# Patient Record
Sex: FEMALE | Race: BLACK OR AFRICAN AMERICAN | NOT HISPANIC OR LATINO | URBAN - METROPOLITAN AREA
[De-identification: names, ages, dates, MRNs, and addresses within clinical notes are randomized per-mention and may not be internally consistent; named-entity substitution may affect disease eponyms.]

---

## 2018-11-04 ENCOUNTER — EMERGENCY (EMERGENCY)
Facility: HOSPITAL | Age: 58
LOS: 1 days | End: 2018-11-04
Payer: COMMERCIAL

## 2018-11-04 PROCEDURE — 73610 X-RAY EXAM OF ANKLE: CPT | Mod: 26,LT

## 2018-11-04 PROCEDURE — 73630 X-RAY EXAM OF FOOT: CPT | Mod: 26,LT

## 2018-11-04 PROCEDURE — 29515 APPLICATION SHORT LEG SPLINT: CPT

## 2018-11-04 PROCEDURE — 99284 EMERGENCY DEPT VISIT MOD MDM: CPT | Mod: 25

## 2023-04-18 ENCOUNTER — OFFICE (OUTPATIENT)
Dept: URBAN - METROPOLITAN AREA CLINIC 38 | Facility: CLINIC | Age: 63
Setting detail: OPHTHALMOLOGY
End: 2023-04-18
Payer: COMMERCIAL

## 2023-04-18 DIAGNOSIS — E11.9: ICD-10-CM

## 2023-04-18 DIAGNOSIS — H52.4: ICD-10-CM

## 2023-04-18 DIAGNOSIS — H52.223: ICD-10-CM

## 2023-04-18 DIAGNOSIS — D31.02: ICD-10-CM

## 2023-04-18 DIAGNOSIS — H35.033: ICD-10-CM

## 2023-04-18 DIAGNOSIS — D31.01: ICD-10-CM

## 2023-04-18 DIAGNOSIS — H43.393: ICD-10-CM

## 2023-04-18 DIAGNOSIS — H35.363: ICD-10-CM

## 2023-04-18 DIAGNOSIS — H43.811: ICD-10-CM

## 2023-04-18 DIAGNOSIS — H25.13: ICD-10-CM

## 2023-04-18 PROCEDURE — 92014 COMPRE OPH EXAM EST PT 1/>: CPT | Performed by: OPTOMETRIST

## 2023-04-18 PROCEDURE — 92250 FUNDUS PHOTOGRAPHY W/I&R: CPT | Performed by: OPTOMETRIST

## 2023-04-18 PROCEDURE — 92015 DETERMINE REFRACTIVE STATE: CPT | Performed by: OPTOMETRIST

## 2023-04-18 ASSESSMENT — REFRACTION_MANIFEST
OS_ADD: +2.25
OU_VA: 20/20
OS_VA2: 20/20(J1+)
OS_AXIS: 043
OD_AXIS: 065
OD_AXIS: 069
OD_SPHERE: -0.75
OS_CYLINDER: -1.25
OD_CYLINDER: -1.50
OS_ADD: +2.50
OS_SPHERE: -0.50
OU_VA: 20/20
OD_CYLINDER: -1.50
OS_SPHERE: -0.75
OS_CYLINDER: -1.25
OD_VA1: 20/20
OS_AXIS: 050
OS_VA1: 20/25
OD_VA1: 20/20
OD_ADD: +2.25
OD_SPHERE: -1.00
OS_VA1: 20/20
OD_VA2: 20/20(J1+)
OD_ADD: +2.50

## 2023-04-18 ASSESSMENT — REFRACTION_AUTOREFRACTION
OS_SPHERE: PLANO
OD_SPHERE: -1.00
OD_CYLINDER: -1.25
OD_AXIS: 77
OS_CYLINDER: -1.50
OS_AXIS: 072

## 2023-04-18 ASSESSMENT — REFRACTION_CURRENTRX
OD_AXIS: 069
OS_SPHERE: -0.25
OS_OVR_VA: 20/
OD_OVR_VA: 20/
OS_CYLINDER: -1.25
OD_ADD: +2.25
OS_ADD: +2.00
OS_VPRISM_DIRECTION: PROGS
OD_VPRISM_DIRECTION: PROGS
OD_CYLINDER: -1.50
OD_SPHERE: -0.75
OS_AXIS: 043

## 2023-04-18 ASSESSMENT — SPHEQUIV_DERIVED
OD_SPHEQUIV: -1.5
OD_SPHEQUIV: -1.75
OS_SPHEQUIV: -1.125
OS_SPHEQUIV: -1.375
OD_SPHEQUIV: -1.625

## 2023-04-18 ASSESSMENT — TONOMETRY
OD_IOP_MMHG: 15
OS_IOP_MMHG: 17

## 2023-04-18 ASSESSMENT — AXIALLENGTH_DERIVED
OD_AL: 24.6837
OS_AL: 24.3769
OS_AL: 24.4813
OD_AL: 24.7908
OD_AL: 24.7372

## 2023-04-18 ASSESSMENT — KERATOMETRY
OS_AXISANGLE_DEGREES: 133
OD_AXISANGLE_DEGREES: 090
OD_K1POWER_DIOPTERS: 42.25
OD_K2POWER_DIOPTERS: 42.25
OS_K1POWER_DIOPTERS: 42.25
METHOD_AUTO_MANUAL: AUTO
OS_K2POWER_DIOPTERS: 43.00

## 2023-04-18 ASSESSMENT — CONFRONTATIONAL VISUAL FIELD TEST (CVF)
OS_FINDINGS: FULL
OD_FINDINGS: FULL

## 2023-04-18 ASSESSMENT — VISUAL ACUITY
OS_BCVA: 20/20-1
OD_BCVA: 20/20-1

## 2023-08-29 PROBLEM — Z00.00 ENCOUNTER FOR PREVENTIVE HEALTH EXAMINATION: Status: ACTIVE | Noted: 2023-08-29

## 2023-09-06 ENCOUNTER — APPOINTMENT (OUTPATIENT)
Dept: CARDIOLOGY | Facility: CLINIC | Age: 63
End: 2023-09-06
Payer: COMMERCIAL

## 2023-09-06 ENCOUNTER — NON-APPOINTMENT (OUTPATIENT)
Age: 63
End: 2023-09-06

## 2023-09-06 VITALS
WEIGHT: 133 LBS | DIASTOLIC BLOOD PRESSURE: 80 MMHG | HEIGHT: 64 IN | HEART RATE: 93 BPM | SYSTOLIC BLOOD PRESSURE: 140 MMHG | OXYGEN SATURATION: 99 % | BODY MASS INDEX: 22.71 KG/M2

## 2023-09-06 VITALS — DIASTOLIC BLOOD PRESSURE: 82 MMHG | SYSTOLIC BLOOD PRESSURE: 148 MMHG

## 2023-09-06 DIAGNOSIS — Z13.6 ENCOUNTER FOR SCREENING FOR CARDIOVASCULAR DISORDERS: ICD-10-CM

## 2023-09-06 DIAGNOSIS — Z78.9 OTHER SPECIFIED HEALTH STATUS: ICD-10-CM

## 2023-09-06 DIAGNOSIS — E11.9 TYPE 2 DIABETES MELLITUS W/OUT COMPLICATIONS: ICD-10-CM

## 2023-09-06 PROCEDURE — 99204 OFFICE O/P NEW MOD 45 MIN: CPT | Mod: 25

## 2023-09-06 PROCEDURE — 93000 ELECTROCARDIOGRAM COMPLETE: CPT

## 2023-09-06 RX ORDER — METFORMIN HYDROCHLORIDE 500 MG/1
500 TABLET, COATED ORAL TWICE DAILY
Refills: 0 | Status: ACTIVE | COMMUNITY
Start: 2023-09-06

## 2023-09-06 NOTE — DISCUSSION/SUMMARY
[EKG obtained to assist in diagnosis and management of assessed problem(s)] : EKG obtained to assist in diagnosis and management of assessed problem(s) [FreeTextEntry1] : 1. Extrasystolic Beats: Recommend 3 day Zio to evaluate burden. Recommend echocardiogram and ETT. Labs reviewed from 7/21/2023 and within normal limits.  2. HTN: Goal BP less than 130/80. Recommend low salt diet. Continue losartan 50mg daily.  Follow up after testing.

## 2023-09-06 NOTE — PHYSICAL EXAM
[No Murmur] : no murmur [Normal] : moves all extremities, no focal deficits, normal speech [de-identified] :  No carotid bruits auscultated bilaterally.. [de-identified] : extrasystolic beats auscultated

## 2023-09-06 NOTE — HISTORY OF PRESENT ILLNESS
[FreeTextEntry1] : 62 year old female with PMHx of HTN, DM presents for a cardiac evaluation for PVCs. Patient is asymptomatic. She has no exertional chest pain, dyspnea or palpitations.   There is no known family history in first degree relatives of cardiovascular disease.  There is no history of MI, CVA, CHF, or previous coronary intervention.

## 2023-10-17 ENCOUNTER — OFFICE (OUTPATIENT)
Dept: URBAN - METROPOLITAN AREA CLINIC 38 | Facility: CLINIC | Age: 63
Setting detail: OPHTHALMOLOGY
End: 2023-10-17
Payer: COMMERCIAL

## 2023-10-17 DIAGNOSIS — H35.363: ICD-10-CM

## 2023-10-17 DIAGNOSIS — H43.811: ICD-10-CM

## 2023-10-17 DIAGNOSIS — E11.9: ICD-10-CM

## 2023-10-17 DIAGNOSIS — H43.393: ICD-10-CM

## 2023-10-17 PROCEDURE — 99213 OFFICE O/P EST LOW 20 MIN: CPT | Performed by: OPTOMETRIST

## 2023-10-17 ASSESSMENT — REFRACTION_CURRENTRX
OD_CYLINDER: -1.50
OS_SPHERE: -0.75
OS_OVR_VA: 20/
OS_ADD: +2.25
OS_VPRISM_DIRECTION: PROGS
OD_SPHERE: -1.00
OD_AXIS: 069
OD_ADD: +2.25
OS_CYLINDER: -1.25
OD_VPRISM_DIRECTION: PROGS
OS_AXIS: 043
OD_OVR_VA: 20/

## 2023-10-17 ASSESSMENT — AXIALLENGTH_DERIVED
OS_AL: 24.426
OD_AL: 24.7847
OD_AL: 24.6776
OS_AL: 24.5309
OD_AL: 24.8926

## 2023-10-17 ASSESSMENT — REFRACTION_MANIFEST
OS_AXIS: 050
OS_CYLINDER: -1.25
OD_AXIS: 065
OS_CYLINDER: -1.25
OD_ADD: +2.25
OS_ADD: +2.50
OS_VA1: 20/25
OD_VA1: 20/20
OS_VA1: 20/20
OD_SPHERE: -0.75
OD_ADD: +2.50
OS_SPHERE: -0.50
OD_CYLINDER: -1.50
OS_ADD: +2.25
OD_VA1: 20/20
OS_VA2: 20/20(J1+)
OS_SPHERE: -0.75
OD_SPHERE: -1.00
OD_AXIS: 069
OU_VA: 20/20
OD_CYLINDER: -1.50
OU_VA: 20/20
OD_VA2: 20/20(J1+)
OS_AXIS: 043

## 2023-10-17 ASSESSMENT — TONOMETRY
OS_IOP_MMHG: 17
OD_IOP_MMHG: 14

## 2023-10-17 ASSESSMENT — REFRACTION_AUTOREFRACTION
OD_SPHERE: -0.50
OS_CYLINDER: -1.50
OD_AXIS: 077
OS_SPHERE: PLANO
OD_CYLINDER: -1.50
OS_AXIS: 074

## 2023-10-17 ASSESSMENT — KERATOMETRY
OD_K1POWER_DIOPTERS: 42.00
OS_K2POWER_DIOPTERS: 42.75
OD_AXISANGLE_DEGREES: 090
METHOD_AUTO_MANUAL: AUTO
OS_AXISANGLE_DEGREES: 146
OS_K1POWER_DIOPTERS: 42.25
OD_K2POWER_DIOPTERS: 42.00

## 2023-10-17 ASSESSMENT — CONFRONTATIONAL VISUAL FIELD TEST (CVF)
OD_FINDINGS: FULL
OS_FINDINGS: FULL

## 2023-10-17 ASSESSMENT — SPHEQUIV_DERIVED
OS_SPHEQUIV: -1.375
OD_SPHEQUIV: -1.25
OD_SPHEQUIV: -1.75
OS_SPHEQUIV: -1.125
OD_SPHEQUIV: -1.5

## 2023-10-17 ASSESSMENT — VISUAL ACUITY
OD_BCVA: 20/25+2
OS_BCVA: 20/25+2

## 2023-10-18 ENCOUNTER — APPOINTMENT (OUTPATIENT)
Dept: CARDIOLOGY | Facility: CLINIC | Age: 63
End: 2023-10-18
Payer: COMMERCIAL

## 2023-10-18 PROCEDURE — 93306 TTE W/DOPPLER COMPLETE: CPT

## 2023-10-24 ENCOUNTER — APPOINTMENT (OUTPATIENT)
Dept: CARDIOLOGY | Facility: CLINIC | Age: 63
End: 2023-10-24
Payer: COMMERCIAL

## 2023-10-24 VITALS
SYSTOLIC BLOOD PRESSURE: 142 MMHG | BODY MASS INDEX: 22.71 KG/M2 | HEIGHT: 64 IN | HEART RATE: 92 BPM | DIASTOLIC BLOOD PRESSURE: 86 MMHG | WEIGHT: 133 LBS | OXYGEN SATURATION: 98 %

## 2023-10-24 PROCEDURE — 93015 CV STRESS TEST SUPVJ I&R: CPT

## 2023-10-24 PROCEDURE — 99214 OFFICE O/P EST MOD 30 MIN: CPT | Mod: 25

## 2023-10-24 RX ORDER — ATORVASTATIN CALCIUM 10 MG/1
10 TABLET, FILM COATED ORAL DAILY
Refills: 0 | Status: ACTIVE | COMMUNITY

## 2023-10-24 RX ORDER — LABETALOL HYDROCHLORIDE 100 MG/1
100 TABLET, FILM COATED ORAL
Qty: 180 | Refills: 3 | Status: ACTIVE | COMMUNITY
Start: 2023-10-24 | End: 1900-01-01

## 2024-04-16 ENCOUNTER — OFFICE (OUTPATIENT)
Dept: URBAN - METROPOLITAN AREA CLINIC 38 | Facility: CLINIC | Age: 64
Setting detail: OPHTHALMOLOGY
End: 2024-04-16
Payer: COMMERCIAL

## 2024-04-16 DIAGNOSIS — H35.363: ICD-10-CM

## 2024-04-16 DIAGNOSIS — H25.13: ICD-10-CM

## 2024-04-16 DIAGNOSIS — H52.223: ICD-10-CM

## 2024-04-16 DIAGNOSIS — D31.02: ICD-10-CM

## 2024-04-16 DIAGNOSIS — H35.033: ICD-10-CM

## 2024-04-16 DIAGNOSIS — H43.811: ICD-10-CM

## 2024-04-16 DIAGNOSIS — H43.393: ICD-10-CM

## 2024-04-16 DIAGNOSIS — E11.9: ICD-10-CM

## 2024-04-16 PROCEDURE — 99213 OFFICE O/P EST LOW 20 MIN: CPT | Performed by: OPTOMETRIST

## 2024-04-23 ENCOUNTER — APPOINTMENT (OUTPATIENT)
Dept: CARDIOLOGY | Facility: CLINIC | Age: 64
End: 2024-04-23
Payer: COMMERCIAL

## 2024-04-23 ENCOUNTER — NON-APPOINTMENT (OUTPATIENT)
Age: 64
End: 2024-04-23

## 2024-04-23 VITALS
OXYGEN SATURATION: 98 % | HEART RATE: 76 BPM | HEIGHT: 64 IN | SYSTOLIC BLOOD PRESSURE: 140 MMHG | WEIGHT: 135 LBS | BODY MASS INDEX: 23.05 KG/M2 | DIASTOLIC BLOOD PRESSURE: 82 MMHG

## 2024-04-23 PROCEDURE — G2211 COMPLEX E/M VISIT ADD ON: CPT

## 2024-04-23 PROCEDURE — 99214 OFFICE O/P EST MOD 30 MIN: CPT

## 2024-04-23 PROCEDURE — 99215 OFFICE O/P EST HI 40 MIN: CPT

## 2024-04-23 PROCEDURE — 93000 ELECTROCARDIOGRAM COMPLETE: CPT

## 2024-04-23 RX ORDER — LOSARTAN POTASSIUM 50 MG/1
50 TABLET, FILM COATED ORAL DAILY
Refills: 0 | Status: DISCONTINUED | COMMUNITY
Start: 2023-09-06 | End: 2024-04-23

## 2024-04-23 RX ORDER — ALENDRONATE SODIUM 70 MG/1
70 TABLET ORAL
Refills: 0 | Status: ACTIVE | COMMUNITY

## 2024-04-23 RX ORDER — AMLODIPINE BESYLATE 10 MG/1
10 TABLET ORAL DAILY
Qty: 90 | Refills: 3 | Status: ACTIVE | COMMUNITY
Start: 2024-04-23 | End: 1900-01-01

## 2024-04-23 NOTE — DISCUSSION/SUMMARY
[FreeTextEntry1] : 1. PACs/short pSVT: recommend continuing Labetalol 100mg BID (high risk medication with no signs of toxicity).  2. HTN: Goal BP less than 130/80. Recommend low salt diet. Reviewed 2/4/2024 labs with patient. Creatinine was 2.27 and it was normal (<1) 7/2023. Patient states she wasn't aware of this finding and she has follow up with PCP and labs in two weeks. She is on losartan 100mg daily. Until the kidney function stabilized, I advise stopping losartan 100mg daily. Replace with amlodipine 10mg daily. Continue Labetalol 100mg BID.   3. Aortic Valve Insufficiency: mild on echocardiogram from 10/18/2023. Recommend BP control to less than 130/80. Periodic echo surveillance.   Follow up in 2 months.  [EKG obtained to assist in diagnosis and management of assessed problem(s)] : EKG obtained to assist in diagnosis and management of assessed problem(s)

## 2024-04-23 NOTE — PHYSICAL EXAM
[No Murmur] : no murmur [Normal] : moves all extremities, no focal deficits, normal speech [de-identified] :  No carotid bruits auscultated bilaterally.. [de-identified] : extrasystolic beats auscultated

## 2024-04-23 NOTE — CARDIOLOGY SUMMARY
[de-identified] : 4/23/20245, NSR, PACs 9/6/2023, NSR, normal ECG [de-identified] : 9/2023, 3 Day Zio: NSR with frequent PACs [de-identified] : 10/24/2023, Plain Treadmill Stress Test: Exercised for 6 minutes and 5 seconds utilizing Standard Alessandro Protocol. No chest pain or abnormal dyspnea. No ECG changes to suggest ischemia. [de-identified] : 10/18/2023, LV EF 60-65%, normal LV diastolic function, mild LVH, mild MR, trace-mild AI, mild TR with estimated PASP of 35mmHg

## 2024-04-23 NOTE — HISTORY OF PRESENT ILLNESS
[FreeTextEntry1] : Historical Perspective: 62 year old female with PMHx of HTN, DM presents for a cardiac evaluation for PVCs. Patient is asymptomatic. She has no exertional chest pain, dyspnea or palpitations.   There is no known family history in first degree relatives of cardiovascular disease.  There is no history of MI, CVA, CHF, or previous coronary intervention.  Current Health Status: Patient with no chest pain, SOB, or palpitations. No hospitalizations since seeing me last. Remains compliant with medications and reports no adverse effects. Reviewed 2/4/2024 labs with patient. Creatinine was 2.27 and it was normal (<1) 7/2023. Patient states she wasn't aware of this finding and she has follow up with PCP and labs in two weeks. She is on losartan 100mg daily.

## 2024-06-26 ENCOUNTER — APPOINTMENT (OUTPATIENT)
Dept: CARDIOLOGY | Facility: CLINIC | Age: 64
End: 2024-06-26
Payer: COMMERCIAL

## 2024-06-26 VITALS
HEART RATE: 88 BPM | WEIGHT: 135 LBS | OXYGEN SATURATION: 97 % | BODY MASS INDEX: 23.05 KG/M2 | DIASTOLIC BLOOD PRESSURE: 60 MMHG | HEIGHT: 64 IN | SYSTOLIC BLOOD PRESSURE: 102 MMHG

## 2024-06-26 DIAGNOSIS — I47.10 SUPRAVENTRICULAR TACHYCARDIA, UNSPECIFIED: ICD-10-CM

## 2024-06-26 DIAGNOSIS — I49.1 ATRIAL PREMATURE DEPOLARIZATION: ICD-10-CM

## 2024-06-26 DIAGNOSIS — I35.1 NONRHEUMATIC AORTIC (VALVE) INSUFFICIENCY: ICD-10-CM

## 2024-06-26 DIAGNOSIS — Z79.899 OTHER LONG TERM (CURRENT) DRUG THERAPY: ICD-10-CM

## 2024-06-26 DIAGNOSIS — I10 ESSENTIAL (PRIMARY) HYPERTENSION: ICD-10-CM

## 2024-06-26 PROCEDURE — 99215 OFFICE O/P EST HI 40 MIN: CPT

## 2024-06-26 PROCEDURE — G2211 COMPLEX E/M VISIT ADD ON: CPT

## 2024-09-02 LAB — HBA1C MFR BLD HPLC: 7.4

## 2024-09-20 ENCOUNTER — RX RENEWAL (OUTPATIENT)
Age: 64
End: 2024-09-20

## 2024-10-19 ENCOUNTER — OFFICE (OUTPATIENT)
Dept: URBAN - METROPOLITAN AREA CLINIC 38 | Facility: CLINIC | Age: 64
Setting detail: OPHTHALMOLOGY
End: 2024-10-19
Payer: COMMERCIAL

## 2024-10-19 DIAGNOSIS — H35.033: ICD-10-CM

## 2024-10-19 DIAGNOSIS — H43.393: ICD-10-CM

## 2024-10-19 DIAGNOSIS — H52.4: ICD-10-CM

## 2024-10-19 DIAGNOSIS — H35.363: ICD-10-CM

## 2024-10-19 DIAGNOSIS — H25.13: ICD-10-CM

## 2024-10-19 PROBLEM — D31.01 NEVUS,CONJUNCTIVAL; RIGHT EYE, LEFT EYE: Status: ACTIVE | Noted: 2024-10-19

## 2024-10-19 PROBLEM — D31.02 NEVUS,CONJUNCTIVAL; RIGHT EYE, LEFT EYE: Status: ACTIVE | Noted: 2024-10-19

## 2024-10-19 PROCEDURE — 92014 COMPRE OPH EXAM EST PT 1/>: CPT | Performed by: OPTOMETRIST

## 2024-10-19 PROCEDURE — 92015 DETERMINE REFRACTIVE STATE: CPT | Performed by: OPTOMETRIST

## 2024-10-19 PROCEDURE — 92250 FUNDUS PHOTOGRAPHY W/I&R: CPT | Performed by: OPTOMETRIST

## 2024-10-19 ASSESSMENT — KERATOMETRY
METHOD_AUTO_MANUAL: AUTO
OD_AXISANGLE_DEGREES: 090
OD_K2POWER_DIOPTERS: 42.00
OD_K1POWER_DIOPTERS: 42.00
OS_AXISANGLE_DEGREES: 145
OS_K2POWER_DIOPTERS: 42.75
OS_K1POWER_DIOPTERS: 42.25

## 2024-10-19 ASSESSMENT — REFRACTION_MANIFEST
OD_ADD: +2.50
OS_AXIS: 043
OD_AXIS: 069
OD_VA2: 20/20(J1+)
OD_ADD: +2.25
OD_SPHERE: -0.75
OS_VA2: 20/20(J1+)
OS_VA1: 20/40
OS_SPHERE: -0.75
OD_AXIS: 069
OU_VA: 20/30
OD_SPHERE: -1.00
OS_VA1: 20/20
OD_VA1: 20/30
OS_AXIS: 050
OS_ADD: +2.25
OS_CYLINDER: -1.25
OD_SPHERE: -0.50
OD_ADD: +2.25
OD_CYLINDER: -1.50
OS_VA1: 20/25
OS_SPHERE: -0.50
OS_SPHERE: -0.50
OS_ADD: +2.50
OS_ADD: +2.25
OD_AXIS: 065
OD_CYLINDER: -1.50
OS_AXIS: 046
OD_CYLINDER: -1.50
OD_VA1: 20/20
OU_VA: 20/20
OU_VA: 20/20
OD_VA1: 20/20
OS_CYLINDER: -1.25
OS_CYLINDER: -1.00

## 2024-10-19 ASSESSMENT — TONOMETRY
OD_IOP_MMHG: 12
OS_IOP_MMHG: 12

## 2024-10-19 ASSESSMENT — REFRACTION_AUTOREFRACTION
OD_SPHERE: -0.50
OS_CYLINDER: -2.00
OD_AXIS: 080
OS_AXIS: 080
OS_SPHERE: +0.75
OD_CYLINDER: -1.25

## 2024-10-19 ASSESSMENT — REFRACTION_CURRENTRX
OD_AXIS: 069
OD_SPHERE: -1.00
OS_AXIS: 043
OS_SPHERE: -0.75
OD_OVR_VA: 20/
OD_CYLINDER: -1.50
OS_ADD: +2.25
OS_CYLINDER: -1.25
OD_VPRISM_DIRECTION: PROGS
OD_ADD: +2.25
OS_OVR_VA: 20/
OS_VPRISM_DIRECTION: PROGS

## 2024-10-19 ASSESSMENT — CONFRONTATIONAL VISUAL FIELD TEST (CVF)
OS_FINDINGS: FULL
OD_FINDINGS: FULL

## 2024-10-19 ASSESSMENT — VISUAL ACUITY
OS_BCVA: 20/40-2
OD_BCVA: 20/40-2

## 2024-12-18 ENCOUNTER — NON-APPOINTMENT (OUTPATIENT)
Age: 64
End: 2024-12-18

## 2024-12-18 ENCOUNTER — APPOINTMENT (OUTPATIENT)
Dept: CARDIOLOGY | Facility: CLINIC | Age: 64
End: 2024-12-18
Payer: COMMERCIAL

## 2024-12-18 VITALS
SYSTOLIC BLOOD PRESSURE: 126 MMHG | HEART RATE: 87 BPM | WEIGHT: 135 LBS | HEIGHT: 64 IN | DIASTOLIC BLOOD PRESSURE: 70 MMHG | OXYGEN SATURATION: 97 % | BODY MASS INDEX: 23.05 KG/M2

## 2024-12-18 DIAGNOSIS — I49.1 ATRIAL PREMATURE DEPOLARIZATION: ICD-10-CM

## 2024-12-18 DIAGNOSIS — I35.1 NONRHEUMATIC AORTIC (VALVE) INSUFFICIENCY: ICD-10-CM

## 2024-12-18 DIAGNOSIS — I10 ESSENTIAL (PRIMARY) HYPERTENSION: ICD-10-CM

## 2024-12-18 DIAGNOSIS — I47.10 SUPRAVENTRICULAR TACHYCARDIA, UNSPECIFIED: ICD-10-CM

## 2024-12-18 PROCEDURE — G2211 COMPLEX E/M VISIT ADD ON: CPT | Mod: NC

## 2024-12-18 PROCEDURE — 93306 TTE W/DOPPLER COMPLETE: CPT

## 2024-12-18 PROCEDURE — 93000 ELECTROCARDIOGRAM COMPLETE: CPT

## 2024-12-18 PROCEDURE — 93356 MYOCRD STRAIN IMG SPCKL TRCK: CPT

## 2024-12-18 PROCEDURE — 99214 OFFICE O/P EST MOD 30 MIN: CPT

## 2025-04-21 ENCOUNTER — OFFICE (OUTPATIENT)
Dept: URBAN - METROPOLITAN AREA CLINIC 38 | Facility: CLINIC | Age: 65
Setting detail: OPHTHALMOLOGY
End: 2025-04-21
Payer: COMMERCIAL

## 2025-04-21 DIAGNOSIS — H25.13: ICD-10-CM

## 2025-04-21 DIAGNOSIS — E11.9: ICD-10-CM

## 2025-04-21 DIAGNOSIS — H01.001: ICD-10-CM

## 2025-04-21 DIAGNOSIS — H43.813: ICD-10-CM

## 2025-04-21 DIAGNOSIS — H52.4: ICD-10-CM

## 2025-04-21 DIAGNOSIS — H01.004: ICD-10-CM

## 2025-04-21 DIAGNOSIS — H35.40: ICD-10-CM

## 2025-04-21 PROCEDURE — 92250 FUNDUS PHOTOGRAPHY W/I&R: CPT | Performed by: OPHTHALMOLOGY

## 2025-04-21 PROCEDURE — 92014 COMPRE OPH EXAM EST PT 1/>: CPT | Performed by: OPHTHALMOLOGY

## 2025-04-21 PROCEDURE — 92015 DETERMINE REFRACTIVE STATE: CPT | Performed by: OPHTHALMOLOGY

## 2025-04-21 ASSESSMENT — REFRACTION_MANIFEST
OU_VA: 20/30
OS_AXIS: 075
OS_CYLINDER: -1.00
OD_SPHERE: -0.50
OU_VA: 20/20-
OD_VA1: 20/30+2
OS_VA2: 20/20(J1+)
OD_CYLINDER: -1.50
OS_SPHERE: -0.50
OD_CYLINDER: -1.50
OU_VA: 20/20-
OD_AXIS: 080
OS_ADD: +2.25
OD_AXIS: 069
OD_VA2: 20/20(J1+)
OD_SPHERE: -0.50
OD_CYLINDER: -1.50
OS_ADD: +2.50
OS_AXIS: 075
OS_VA1: 20/40
OS_CYLINDER: -1.25
OD_VA2: 20/20(J1+)
OS_VA1: 20/20-
OS_VA1: 20/20-
OS_CYLINDER: -1.25
OS_VA2: 20/20(J1+)
OD_ADD: +2.50
OD_SPHERE: -0.50
OD_VA1: 20/30
OS_AXIS: 046
OD_VA1: 20/30+2
OS_ADD: +2.50
OS_SPHERE: +0.25
OD_ADD: +2.25
OD_AXIS: 080
OS_SPHERE: +0.25
OD_ADD: +2.50

## 2025-04-21 ASSESSMENT — REFRACTION_AUTOREFRACTION
OD_SPHERE: -0.25
OS_AXIS: 075
OD_AXIS: 077
OS_CYLINDER: -1.50
OS_SPHERE: +0.50
OD_CYLINDER: -1.50

## 2025-04-21 ASSESSMENT — REFRACTION_CURRENTRX
OD_OVR_VA: 20/
OD_VPRISM_DIRECTION: PROGS
OD_CYLINDER: -1.50
OD_SPHERE: -1.00
OS_SPHERE: -0.75
OD_AXIS: 078
OS_CYLINDER: -1.25
OS_AXIS: 045
OS_OVR_VA: 20/
OD_ADD: +2.25
OS_ADD: +2.25
OS_VPRISM_DIRECTION: PROGS

## 2025-04-21 ASSESSMENT — VISUAL ACUITY
OS_BCVA: 20/30+
OD_BCVA: 20/30

## 2025-04-21 ASSESSMENT — CONFRONTATIONAL VISUAL FIELD TEST (CVF)
OS_FINDINGS: FULL
OD_FINDINGS: FULL

## 2025-04-21 ASSESSMENT — KERATOMETRY
OD_K1POWER_DIOPTERS: 42.00
OS_AXISANGLE_DEGREES: 140
METHOD_AUTO_MANUAL: AUTO
OS_K2POWER_DIOPTERS: 42.50
OD_K2POWER_DIOPTERS: 42.25
OD_AXISANGLE_DEGREES: 110
OS_K1POWER_DIOPTERS: 41.75

## 2025-04-21 ASSESSMENT — TONOMETRY
OS_IOP_MMHG: 13
OD_IOP_MMHG: 14

## 2025-04-21 ASSESSMENT — LID EXAM ASSESSMENTS
OD_MEIBOMITIS: RUL 1+
OS_BLEPHARITIS: LUL 1+
OS_MEIBOMITIS: LUL 1+
OD_BLEPHARITIS: RUL 1+

## 2025-06-10 ENCOUNTER — NON-APPOINTMENT (OUTPATIENT)
Age: 65
End: 2025-06-10

## 2025-06-11 ENCOUNTER — APPOINTMENT (OUTPATIENT)
Dept: CARDIOLOGY | Facility: CLINIC | Age: 65
End: 2025-06-11
Payer: COMMERCIAL

## 2025-06-11 VITALS
OXYGEN SATURATION: 96 % | BODY MASS INDEX: 23.05 KG/M2 | HEART RATE: 81 BPM | DIASTOLIC BLOOD PRESSURE: 60 MMHG | WEIGHT: 135 LBS | HEIGHT: 64 IN | SYSTOLIC BLOOD PRESSURE: 112 MMHG

## 2025-06-11 PROCEDURE — 93000 ELECTROCARDIOGRAM COMPLETE: CPT

## 2025-06-11 PROCEDURE — G2211 COMPLEX E/M VISIT ADD ON: CPT | Mod: NC

## 2025-06-11 PROCEDURE — 99214 OFFICE O/P EST MOD 30 MIN: CPT
